# Patient Record
Sex: FEMALE | Race: BLACK OR AFRICAN AMERICAN | URBAN - METROPOLITAN AREA
[De-identification: names, ages, dates, MRNs, and addresses within clinical notes are randomized per-mention and may not be internally consistent; named-entity substitution may affect disease eponyms.]

---

## 2021-06-28 ENCOUNTER — OFFICE VISIT (OUTPATIENT)
Dept: URBAN - METROPOLITAN AREA CLINIC 96 | Facility: CLINIC | Age: 46
End: 2021-06-28
Payer: COMMERCIAL

## 2021-06-28 ENCOUNTER — WEB ENCOUNTER (OUTPATIENT)
Dept: URBAN - METROPOLITAN AREA CLINIC 96 | Facility: CLINIC | Age: 46
End: 2021-06-28

## 2021-06-28 ENCOUNTER — DASHBOARD ENCOUNTERS (OUTPATIENT)
Age: 46
End: 2021-06-28

## 2021-06-28 VITALS
WEIGHT: 260 LBS | SYSTOLIC BLOOD PRESSURE: 150 MMHG | HEART RATE: 81 BPM | HEIGHT: 64 IN | TEMPERATURE: 97 F | DIASTOLIC BLOOD PRESSURE: 85 MMHG | BODY MASS INDEX: 44.39 KG/M2

## 2021-06-28 DIAGNOSIS — D50.9 IRON DEFICIENCY ANEMIA, UNSPECIFIED IRON DEFICIENCY ANEMIA TYPE: ICD-10-CM

## 2021-06-28 DIAGNOSIS — D50.8 ACQUIRED IRON DEFICIENCY ANEMIA DUE TO DECREASED ABSORPTION: ICD-10-CM

## 2021-06-28 DIAGNOSIS — K21.9 GASTROESOPHAGEAL REFLUX DISEASE, UNSPECIFIED WHETHER ESOPHAGITIS PRESENT: ICD-10-CM

## 2021-06-28 DIAGNOSIS — E66.01 MORBID (SEVERE) OBESITY DUE TO EXCESS CALORIES: ICD-10-CM

## 2021-06-28 PROBLEM — 83911000119104: Status: ACTIVE | Noted: 2021-06-28

## 2021-06-28 PROBLEM — 408512008: Status: ACTIVE | Noted: 2021-06-28

## 2021-06-28 PROCEDURE — 99204 OFFICE O/P NEW MOD 45 MIN: CPT | Performed by: INTERNAL MEDICINE

## 2021-06-28 PROCEDURE — 99244 OFF/OP CNSLTJ NEW/EST MOD 40: CPT | Performed by: INTERNAL MEDICINE

## 2021-06-28 RX ORDER — OMEPRAZOLE 40 MG/1
1 CAPSULE 30 MINUTES BEFORE MORNING MEAL CAPSULE, DELAYED RELEASE ORAL ONCE A DAY
Status: ACTIVE | COMMUNITY

## 2021-06-28 RX ORDER — SODIUM, POTASSIUM,MAG SULFATES 17.5-3.13G
177ML SOLUTION, RECONSTITUTED, ORAL ORAL
Qty: 177 MILLILITER | Refills: 0 | OUTPATIENT
Start: 2021-06-28 | End: 2021-06-29

## 2021-06-28 NOTE — PHYSICAL EXAM GASTROINTESTINAL
Abdomen , scars, obese, soft, nontender, nondistended , no guarding or rigidity , no masses palpable , normal bowel sounds , Liver and Spleen , no hepatomegaly present , no hepatosplenomegaly , liver nontender , spleen not palpable

## 2021-06-28 NOTE — HPI-TODAY'S VISIT:
Patient being seen in consultation as requested by Dr. Kemi Peralta for GERD.  A copy of this document angela be sent to the physician.   Patient presents for reflux evaluation. She reports heartburn, reflux. No dysphagia, no odynophagia, no n/v, no melena, no rectal bleeding. No chest pain or shortness or breath. Denies any unintentional weight loss or abdominal pain. No food allergies or diet restrictions. No chronic NSAIDs. She also reports recent "severe anemia". Hb of 8 per patient. Had been having ice pica. No rectal bleeding, no melena, no change in BM. No unintentional weight loss. Does reports menstral cycle has changed with heavy menstrual bleeding. Going to see GYN for possible fibroids.   Patient denies any prior colonoscopy. No changes in bowel habits, no rectal bleeding, no abdominal pain, no unintentional weight loss. No family history of colon polyps or colon cancer, no history of anesthesia problems. Up to date with primary MD.   No diet restrictions. No easy bruising or bleeding.  Dr. Barbosa performed vertical sleeve in 2015, no post op issues. Lost total 108#, back 40#.   Taking omeprazole or Nexium which does help. No prior EGD.

## 2021-07-15 PROBLEM — 87522002: Status: ACTIVE | Noted: 2021-06-28

## 2021-08-19 ENCOUNTER — OFFICE VISIT (OUTPATIENT)
Dept: URBAN - METROPOLITAN AREA SURGERY CENTER 18 | Facility: SURGERY CENTER | Age: 46
End: 2021-08-19